# Patient Record
Sex: MALE | Race: WHITE | NOT HISPANIC OR LATINO | URBAN - METROPOLITAN AREA
[De-identification: names, ages, dates, MRNs, and addresses within clinical notes are randomized per-mention and may not be internally consistent; named-entity substitution may affect disease eponyms.]

---

## 2020-07-23 ENCOUNTER — EMERGENCY (EMERGENCY)
Facility: HOSPITAL | Age: 30
LOS: 1 days | Discharge: HOME | End: 2020-07-23
Attending: EMERGENCY MEDICINE | Admitting: EMERGENCY MEDICINE
Payer: SELF-PAY

## 2020-07-23 VITALS
HEART RATE: 88 BPM | SYSTOLIC BLOOD PRESSURE: 121 MMHG | TEMPERATURE: 98 F | DIASTOLIC BLOOD PRESSURE: 83 MMHG | HEIGHT: 74 IN | RESPIRATION RATE: 16 BRPM | WEIGHT: 160.06 LBS | OXYGEN SATURATION: 95 %

## 2020-07-23 PROCEDURE — 99282 EMERGENCY DEPT VISIT SF MDM: CPT

## 2020-07-23 PROCEDURE — 99053 MED SERV 10PM-8AM 24 HR FAC: CPT

## 2020-07-23 NOTE — ED PROVIDER NOTE - PATIENT PORTAL LINK FT
You can access the FollowMyHealth Patient Portal offered by [facility] by registering at the following website: [email or web address]. By joining [facility]’s FollowMyHealth portal, you will also be able to view your health information using other applications (apps) compatible with our system.

## 2020-07-23 NOTE — ED PROVIDER NOTE - CLINICAL SUMMARY MEDICAL DECISION MAKING FREE TEXT BOX
Pt with social requests to return to [place], homeless.  Looks well, nad, nl exam but limited due to patients refusal for extensive eval.  AOx3, no si/hi, odd thoughts but nothing dangerous and does not appear acutely psychotic.  Plan social work eval and dc with referrals for addn resources.

## 2020-07-23 NOTE — ED ADULT TRIAGE NOTE - CHIEF COMPLAINT QUOTE
"I need financial help to go back to [place] for my medical needs. I think that the health care system will take better care of me there."

## 2020-07-23 NOTE — ED PROVIDER NOTE - CONSTITUTIONAL, MLM
normal... Well appearing, awake, alert, oriented to person, place, time/situation and in no apparent distress.  Well kempt.

## 2020-07-23 NOTE — ED ADULT TRIAGE NOTE - ARRIVAL INFO ADDITIONAL COMMENTS
Patient is an un-domiciled male, presented to the ED with requests for financial assistance to "travel to [place] for medical needs." Patient denies any prior medical history, but reports that "I need my bottom cleared out by a doctor in [place]." Patient alert, verbally coherent, calm, and oriented to self, location, and day. Patient denies any prior psychiatric history. Patient denies HI and SI. Patient denies physical pain and discomfort. Patient denies any visual/auditory hallucination

## 2020-07-23 NOTE — ED ADULT NURSE REASSESSMENT NOTE - NS ED NURSE REASSESS COMMENT FT1
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 Noted to be calm, non-agitated, and cooperative with reasonable direction. 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 Noted to be calm, non-agitated, and cooperative with reasonable direction.  Patient noted to be dressed in winter clothing. When inquired as to the reason of the patient's choice of garment while the weather outside is currently humid, patient responds, "it is a bit warm, now that you mention it."

## 2020-07-23 NOTE — ED PROVIDER NOTE - NSFOLLOWUPINSTRUCTIONS_ED_ALL_ED_FT
Follow up with social work services as directed.  Return for any medical complaints.  Follow up at:    [facility] [place] [address]  Appointments:	[phone]

## 2020-07-23 NOTE — ED ADULT NURSE NOTE - OBJECTIVE STATEMENT
pt is 29y male, here for social concern, pt would like to get a procedure to "clean out feces out of my body in [place]" but does not have means to get there, pt reports he is homeless, A&Ox3, ambulates with steady gait, denies any SI/HI, also denies an ydrug or ETOH use, clothing moderately soiled but pt does not appear disheveled, NAD present

## 2020-07-27 DIAGNOSIS — Z60.9 PROBLEM RELATED TO SOCIAL ENVIRONMENT, UNSPECIFIED: ICD-10-CM

## 2020-07-27 SDOH — SOCIAL STABILITY - SOCIAL INSECURITY: PROBLEM RELATED TO SOCIAL ENVIRONMENT, UNSPECIFIED: Z60.9
